# Patient Record
Sex: MALE | Race: WHITE | NOT HISPANIC OR LATINO | Employment: STUDENT | URBAN - METROPOLITAN AREA
[De-identification: names, ages, dates, MRNs, and addresses within clinical notes are randomized per-mention and may not be internally consistent; named-entity substitution may affect disease eponyms.]

---

## 2024-06-09 ENCOUNTER — HOSPITAL ENCOUNTER (EMERGENCY)
Facility: CLINIC | Age: 24
Discharge: HOME | End: 2024-06-09
Attending: EMERGENCY MEDICINE | Admitting: EMERGENCY MEDICINE
Payer: COMMERCIAL

## 2024-06-09 VITALS
BODY MASS INDEX: 24.38 KG/M2 | RESPIRATION RATE: 16 BRPM | OXYGEN SATURATION: 99 % | HEART RATE: 51 BPM | DIASTOLIC BLOOD PRESSURE: 61 MMHG | SYSTOLIC BLOOD PRESSURE: 118 MMHG | TEMPERATURE: 97.7 F | HEIGHT: 74 IN | WEIGHT: 190 LBS

## 2024-06-09 DIAGNOSIS — S06.0X0A CONCUSSION WITHOUT LOSS OF CONSCIOUSNESS, INITIAL ENCOUNTER: ICD-10-CM

## 2024-06-09 PROCEDURE — 99283 EMERGENCY DEPT VISIT LOW MDM: CPT

## 2024-06-09 PROCEDURE — 250N000013 HC RX MED GY IP 250 OP 250 PS 637: Performed by: EMERGENCY MEDICINE

## 2024-06-09 RX ORDER — ACETAMINOPHEN 500 MG
1000 TABLET ORAL ONCE
Status: COMPLETED | OUTPATIENT
Start: 2024-06-09 | End: 2024-06-09

## 2024-06-09 RX ADMIN — ACETAMINOPHEN 1000 MG: 500 TABLET, FILM COATED ORAL at 03:44

## 2024-06-09 ASSESSMENT — COLUMBIA-SUICIDE SEVERITY RATING SCALE - C-SSRS
6. HAVE YOU EVER DONE ANYTHING, STARTED TO DO ANYTHING, OR PREPARED TO DO ANYTHING TO END YOUR LIFE?: NO
2. HAVE YOU ACTUALLY HAD ANY THOUGHTS OF KILLING YOURSELF IN THE PAST MONTH?: NO
1. IN THE PAST MONTH, HAVE YOU WISHED YOU WERE DEAD OR WISHED YOU COULD GO TO SLEEP AND NOT WAKE UP?: NO

## 2024-06-09 ASSESSMENT — ACTIVITIES OF DAILY LIVING (ADL)
ADLS_ACUITY_SCORE: 33
ADLS_ACUITY_SCORE: 35

## 2024-06-09 NOTE — ED PROVIDER NOTES
"  Emergency Department Note      History of Present Illness     Chief Complaint  Head Injury    HPI  Yenny Cazares is a 23 year old male presents after a head injury.  He reports that at approximately 2000 he was playing in a soccer game and collided with another player and was accidentally kneed in the head.  He states he did not lose consciousness, but felt dazed for a minute.  He had some pain initially, but the pain has improved.  He has not had any vomiting or other symptoms.  He has not taken anything for the pain.  He does report prior concussions in the past.      Review of External Notes   note 4/29/19 for concussion  Past Medical History   Medical History and Problem List  No past medical history on file.    Medications  No current outpatient medications on file.      Surgical History   No past surgical history on file.  Physical Exam   Patient Vitals for the past 24 hrs:   BP Temp Temp src Pulse Resp SpO2 Height Weight   06/09/24 0257 118/61 97.7  F (36.5  C) Temporal 51 16 99 % 1.88 m (6' 2\") 86.2 kg (190 lb)     Physical Exam  General: Appears well-developed and well-nourished.   Head: No signs of trauma.   Mouth/Throat: Oropharynx is clear and moist.   Eyes: Conjunctivae are normal. Pupils are equal, round, and reactive to light.   Neck: Normal range of motion. No nuchal rigidity. No cervical adenopathy  CV: Normal rate and regular rhythm.    Resp: Effort normal and breath sounds normal. No respiratory distress.   MSK: Normal range of motion. no edema. No Calf tenderness.  Neuro: The patient is alert and oriented to person, place, and time.  PERRLA, EOMI, strength in upper/lower extremities normal and symmetrical. Sensation normal. Speech normal.  Skin: Skin is warm and dry. No rash noted.   Psych: normal mood and affect. behavior is normal.     Diagnostics     ED Course    Medications Administered  Medications   acetaminophen (TYLENOL) tablet 1,000 mg (1,000 mg Oral $Given 6/9/24 5504) "       Procedures  Procedures       Social Determinants of Health adding to complexity of care  None    ED Course       [place] Head CT Rule  (calculator)  Background  Assesses need for head imaging in acute trauma  Only validated in adults with GCS 13-15 with witnessed LOC, amnesia to head injury or confusion  Data  23 year old  High Risk Criteria (major criteria)   Of 5 possible items  NEGATIVE  [place] Coma Scale <15 at 2 hours after injury  Open or depressed skull Fracture  Vomiting (Two or more episodes)  Age 65 years or over (other studies suggest age 60)  Basal skull Fracture signs (Hemotympanum, Periorbital Bruising -Raccoon's Eyes, Mastoid process Ecchymosis -Alvarez's Sign, Cerebrospinal fluid leakage from ear or nose)    Moderate Risk Criteria (minor criteria)   Of 3 possible items  NEGATIVE  Pre-trauma amnesia lasting longer than 30 minutes  High risk mechanism of injury (Pedestrian in motor vehicle accident, Passenger ejected from vehicle, Fall from height over 3 feet or 5 stairs)    Interpretation  No indications for head imaging    Medical Decision Making / Diagnosis     JEFF Cazares is a 23 year old male presents after a head injury.  Reports earlier in the evening he was playing soccer and accidentally collided it with another player got hit in the head.  He did not lose consciousness but felt somewhat dazed initially.  Since that time he states that the pain has improved.  He has not had any vomiting or other symptoms.  On my evaluation he is fully neurologically intact.  There is no clear external signs of trauma.  Based on the [place] head CT rule he is low risk for significant intracranial pathology.  I did discuss this with the patient and his friend who had accompanied him.  I discussed the pros and cons of a CT scan, and the patient was comfortable with not doing a CT scan at this time given the radiation exposure.  I did discuss that he likely has a concussion and discussed concussion  recommendations with him.  He was discharged with the patient for supportive care and given referral to the concussion clinic.  Return precautions were discussed with the patient and his friend.    Disposition  The patient was discharged.     ICD-10 Codes:    ICD-10-CM    1. Concussion without loss of consciousness, initial encounter  S06.0X0A Concussion  Referral           Discharge Medications  There are no discharge medications for this patient.        MD Roman Campoverde John A, MD  06/09/24 0518

## 2024-06-09 NOTE — ED TRIAGE NOTES
Pt was playing soccer at 2000 last night and another player kneed or kicked him in the head. Pt reports he does not remember the impact but came to 30 seconds after impact. Pt reports on and off HA since.      Triage Assessment (Adult)       Row Name 06/09/24 0259          Triage Assessment    Airway WDL WDL        Respiratory WDL    Respiratory WDL WDL        Cardiac WDL    Pulse Rate & Regularity bradycardic        Cognitive/Neuro/Behavioral WDL    Cognitive/Neuro/Behavioral WDL WDL

## 2024-06-11 ENCOUNTER — OFFICE VISIT (OUTPATIENT)
Dept: ORTHOPEDICS | Facility: CLINIC | Age: 24
End: 2024-06-11
Attending: EMERGENCY MEDICINE
Payer: COMMERCIAL

## 2024-06-11 VITALS — HEIGHT: 74 IN | BODY MASS INDEX: 24.38 KG/M2 | WEIGHT: 190 LBS

## 2024-06-11 DIAGNOSIS — S06.0X0A CONCUSSION WITHOUT LOSS OF CONSCIOUSNESS, INITIAL ENCOUNTER: Primary | ICD-10-CM

## 2024-06-11 PROCEDURE — 99204 OFFICE O/P NEW MOD 45 MIN: CPT | Performed by: STUDENT IN AN ORGANIZED HEALTH CARE EDUCATION/TRAINING PROGRAM

## 2024-06-11 RX ORDER — ESCITALOPRAM OXALATE 5 MG/1
1 TABLET ORAL
COMMUNITY
Start: 2024-05-20

## 2024-06-11 NOTE — Clinical Note
6/11/2024      Yenny Cazares  [address] [facility] [address]      Dear Colleague,    Thank you for referring your patient, Yenny Cazares, to the [facility] [place]. Please see a copy of my visit note below.    New Concussion Visit     Assessment  [unfilled]   Yenny Cazares presents with the following concussion trajectories.  {Trajectories:053057}    PLAN  Activity Recommendations  Yenny Cazares was provided with the following activity recommendations: {Concussionplan:740871}    Specialty Referrals:  Yenny Cazares is being referred for additional follow up with the following specialty/specialties:     Academic Recommendations:  Yenny Cazares was provided with the following return to learn recommendations. {RW PT CONCUSSION SPECIFIC ACCOMODATIONS:765735}    Sleep Hygiene:  We discussed the importance of sleep hygiene and provided you with a handout discussing some tips to help you achieve adequate rest and recovery.     Nutrition/Hydration:  -We discussed the importance of proper diet and hydration during concussion recovery and provided a handout outlining helpful nutritional tips. Be sure to drink enough water to satisfy your thirst. Have a drink readily available throughout the day. Do not force yourself to over-drink as this can be dangerous.     Diagnosis and management of concussion discussed with patient and/or caregiver(s) who verbalized understanding and agreement with the above treatment plan. Discussion included risk of repeat concussion, red flags for return to clinic/ER.    Time spent in one-on-one evaluation and discussion with patient regarding nature of problem, course, prior treatments, and therapeutic options, at least 50% of which was spent in counseling and coordination of care:  *** minutes including time spent in administration, interpretation, analysis and discussion of the role of SCOAT6 testing.    ALIZA Calvo DO CAQSM       Subjective    Yenny Cazares is a 23 year old  male coming to sports medicine clinic today for evaluation of a head injury sustained on 6/8/24 . Here today by himself. Referred to us by ***. 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 Yenny Cazares goes to school at [employer] in [place], [place].  Mechanism of injury - he was playing in a soccer game when he collided with another player and believes he got kneed in the head. The next thing he remembers is the AT standing over him. He did return to play for ~ 30 minutes and was subbed out around the 85 minute trista of the game. Treatment to date includes Tylenol. Imaging to date: none  Academic Schedule includes - currently on summer break  Employment includes coaching part time     A) Symptoms at time of injury? memory problems and tremor  B) Mechanism of injury: Direct blow to head   C) Location of impact: Other top of head   D) Did patient see hit coming? no   E) Did patient continue playing after injury? Yes for ~ 30 minutes    Amnesia   Are there any events around the time of injury that you/person has no memory of even briefly? Yes ~ 30 minutes after injury    Loss of Consciousness   Did you/person lose consciousness? He is unsure but does not think so    Early Signs {Early Signs:35331}    Seizures  Were seizures/postures observed?   no    Sleep Changes No Issues but feeling more tired    Emotional/Personality  Is Yenny Cazares acting different compared to his/her usual self? yes      Risk Factors for Protracted Recovery:    History of Previous Concussions? Yes - 5 previous    If Yes, When? Dates: 2018, 2019, spring 2023, fall 2023, spring 2024 - Duration of symptoms: 1 week was the longest     If multiple concussions, did less force cause reinjury? no    Intrinsic Risk Factors  Younger Age (<13)  no   Female Gender no   Diagnosed Learning Disability, ADD, ADHD no   Migraine History & Symptoms; if yes include treatment no   Motion Sickness no   Ocular Dysfunction  "no   Sleep Problems before the injury no     Other Relevant Medical History   Anxiety Yes - lexapro   Depression Yes - not currently treating   Seizures no   Syncope no   Cardiovascular/POTS no     Relevant Family History   Migraine History & Symptoms no   Diagnosed Learning Disability no   Anxiety no   Depression no   Ocular Dysfunction no   Seizures no     Symptom Score:    Symptom 6/11/24    Headache 1    \"Pressure in head\" 2    Neck pain 2    Nausea or vomiting 0    Dizziness 1    Blurred vision 0    Balance problems 0    Sensitivity to light 0    Sensitivity to noise 0    Feeling slowed down 2    Feeling \"like in a fog\" 2    Difficulty concentrating 1    Difficulty remembering 1    Fatigue or low energy 2    Confusion 1    Drowsiness 2    More emotional 0    Irritability 0    Sadness 0    Nervous or anxious 0    Sleep disturbance 0    Abnormal heart rate 0    Excessive sweating 0    Number of symptoms 11/23    Total symptom score 17/138        Past Medical/Surgical/Social History   I have reviewed past medical, surgical, social and family history, medications and allergies as documented in the EMR.  Current Problem List:does not have a problem list on file.  Past Medical History:  has no past medical history on file.   Past Surgical History:  has no past surgical history on file.  Current Medications: currently has no medications in their medication list.  Allergies: has No Known Allergies.  Social History: [unfilled]  Family History: family history is not on file.    Review of Systems   A comprehensive 14 point ROS was performed, reviewed, and the pertinent orthopaedic findings are documented in the HPI.    Physical Examination  There were no vitals taken for this visit.  HEENT:   NC/AT, auditory canals patent, Oropharynx normal, PERRLA, EOMI, no fundic pathology  NECK:    normal ROM ***       No spinal process tenderness, Spurling's negative  NEURO: Finger-to-nose testing, Romberg negative, no pronator drift, " DTR/MS/Sensation normal in both upper extremities      Cervical Screening:  Neck Pain: ***/10       Palpation: {FSOC CSPINE PALPATION REV:273405}   Range of Motion   Flexion  {Normal/abnormal.:968016}  Symptoms: {YES/NO:60}   Extension  {Normal/abnormal.:798808}  Symptoms: {YES/NO:60}   Left Rotation  {Normal/abnormal.:364984}  Symptoms:{YES/NO:60}   Right Rotation  {Normal/abnormal.:084273}  Symptoms: {YES/NO:60}   Left Sidebend  {Normal/abnormal.:710421}  Symptoms: {YES/NO:60}   Right Sidebend  {Normal/abnormal.:296981}  Symptoms: {YES/NO:60}       Cognitive Assessment:    [facility]   24   Orientation: ***/5       Immediate Recall: Word List: B   Trial 1: 8/10   Trial 2: 8/10   Trial 3:  10/10   Total:        Concentration    Digits Backward: 34   Months Backward:   (20 sec) (0 errors)   Total:        DLROW able   Serial 7s able       Delayed Recall: 7/10       Total Score: ***/50     Balance Testing (mBess)  Double le errors  Single le errors - left foot  Tandem: 1 errors    Timed Tandem Gait  {Performed/Not:84522}  Trial 1 *** sec   Trial 2 *** sec   Trial 3 *** sec   Average *** sec   Errors {TTGERRORS:69054}   Normal <11.7sec    Complex Tandem Gait  {Performed/Not:24850}  Forward *** errors   Backward *** errors   Total *** errors   Abnormal: 5+ errrors       Vestibular Testin24   Smooth Pursuits  abnormal, nystagmus   Symptoms: Yes but no change from baseline   Saccades - Horizontal  normal  Symptoms:Yes but no change   Saccades - Vertical  normal  Symptoms: Yes: dizziness    Near Point of Convergence (Normal 6-8 cm)  7 cm to blurry vision  6 cm to double vision  Symptoms: Yes: head pressure   VOR - Horizontal  normal  Symptoms: Yes: dizziness   VOR - Vertical  normal  Symptoms: Yes: dizziness   VMS  normal  Symptoms: Yes: dizziness           Again, thank you for allowing me to participate in the care of your patient.        Sincerely,        Grady Calvo,

## 2024-06-11 NOTE — PROGRESS NOTES
New Concussion Visit     Assessment  Concussion without loss of consciousness, initial encounter  (primary encounter diagnosis)  Yenny Cazares presents with the following concussion trajectories.  Vestibular, Cognitive/fatigue, Post-traumatic migraine/headache     PLAN  Activity Recommendations  Yenny Cazares was provided with the following activity recommendations: Restricted from activity at this time     Specialty Referrals:  Yenny Cazares is being referred for additional follow up with the following specialty/specialties: Physical therapy     Academic Recommendations:  Yenny Cazares was provided with the following return to learn recommendations:  N/a    Sleep Hygiene:  We discussed the importance of sleep hygiene and provided you with a handout discussing some tips to help you achieve adequate rest and recovery.     Nutrition/Hydration:  -We discussed the importance of proper diet and hydration during concussion recovery and provided a handout outlining helpful nutritional tips. Be sure to drink enough water to satisfy your thirst. Have a drink readily available throughout the day. Do not force yourself to over-drink as this can be dangerous.     Diagnosis and management of concussion discussed with patient and/or caregiver(s) who verbalized understanding and agreement with the above treatment plan. Discussion included risk of repeat concussion, red flags for return to clinic/ER.    ALIZA Calvo DO CAQSM       Subjective    Yenny Cazares is a 23 year old male coming to sports medicine clinic today for evaluation of a head injury sustained on 6/8/24 . Here today by himself. Referred to us by Dr. Owens. 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 Yenny Cazares goes to school at [employer] in [place], [place]. Patient is from [place] but is in [place] this summer to play soccer. Mechanism of injury - he was playing in a soccer game when he collided with another player and  believes he got kneed in the head. The next thing he remembers is the AT standing over him. He did return to play for ~ 30 minutes and was subbed out around the 85 minute trista of the game. Patient was evaluated in the ED on 6/9/24. Treatment to date includes Tylenol, rest. Imaging to date: none  Academic Schedule - currently on summer break  Employment includes coaching part time     A) Symptoms at time of injury? memory problems and tremor  B) Mechanism of injury: Direct blow to head   C) Location of impact: Other top of head   D) Did patient see hit coming? no   E) Did patient continue playing after injury? Yes for ~ 30 minutes    Amnesia   Are there any events around the time of injury that you/person has no memory of even briefly? Yes ~ 30 minutes after injury    Loss of Consciousness   Did you/person lose consciousness? He is unsure but does not think so    Early Signs     Seizures  Were seizures/postures observed?   no    Sleep Changes No Issues but feeling more tired    Emotional/Personality  Is Yenny Cazares acting different compared to his/her usual self? yes      Risk Factors for Protracted Recovery:    History of Previous Concussions? Yes - 5 previous    If Yes, When? Dates: 2018, 2019, spring 2023, fall 2023, spring 2024 - Duration of symptoms: 1 week was the longest     If multiple concussions, did less force cause reinjury? no    Intrinsic Risk Factors  Younger Age (<13)  no   Female Gender no   Diagnosed Learning Disability, ADD, ADHD no   Migraine History & Symptoms; if yes include treatment no   Motion Sickness no   Ocular Dysfunction no   Sleep Problems before the injury no     Other Relevant Medical History   Anxiety Yes - lexapro   Depression Yes - not currently treating   Seizures no   Syncope no   Cardiovascular/POTS no     Relevant Family History   Migraine History & Symptoms no   Diagnosed Learning Disability no   Anxiety no   Depression no   Ocular Dysfunction no   Seizures no     Symptom  "Score:    Symptom 6/11/24    Headache 1    \"Pressure in head\" 2    Neck pain 2    Nausea or vomiting 0    Dizziness 1    Blurred vision 0    Balance problems 0    Sensitivity to light 0    Sensitivity to noise 0    Feeling slowed down 2    Feeling \"like in a fog\" 2    Difficulty concentrating 1    Difficulty remembering 1    Fatigue or low energy 2    Confusion 1    Drowsiness 2    More emotional 0    Irritability 0    Sadness 0    Nervous or anxious 0    Sleep disturbance 0    Abnormal heart rate 0    Excessive sweating 0    Number of symptoms 11/23    Total symptom score 17/138        Past Medical/Surgical/Social History   I have reviewed past medical, surgical, social and family history, medications and allergies as documented in the EMR.  Current Problem List:does not have a problem list on file.  Past Medical History:  has no past medical history on file.   Past Surgical History:  has no past surgical history on file.  Current Medications: currently has no medications in their medication list.  Allergies: has No Known Allergies.  Social History: [unfilled]  Family History: family history is not on file.    Review of Systems   A comprehensive 14 point ROS was performed, reviewed, and the pertinent orthopaedic findings are documented in the HPI.    Physical Examination  There were no vitals taken for this visit.  HEENT:   NC/AT, auditory canals patent, Oropharynx normal, PERRLA, EOMI, no fundic pathology  NECK:    normal ROM        No spinal process tenderness, Spurling's negative  NEURO: Finger-to-nose testing, Romberg negative, no pronator drift, DTR/MS/Sensation normal in both upper extremities      Cervical Screening:  Neck Pain: 1/10       Palpation: none   Range of Motion   Flexion  normal  Symptoms: No   Extension  normal  Symptoms: No   Left Rotation  normal  Symptoms:No   Right Rotation  normal  Symptoms: No   Left Sidebend  normal  Symptoms: No   Right Sidebend  normal  Symptoms: No       Cognitive " Assessment:    [facility]   24   Orientation:        Immediate Recall: Word List: B   Trial 1: 8/10   Trial 2: 8/10   Trial 3:  10/10   Total:        Concentration    Digits Backward: 3/4   Months Backward:   (20 sec) (0 errors)   Total:        DLROW able   Serial 7s able       Delayed Recall: 7/10       Total Score: 23/50     Balance Testing (mBess)  Double le errors  Single le errors - left foot  Tandem: 1 errors    Vestibular Testin24   Smooth Pursuits  abnormal, nystagmus   Symptoms: Yes but no change from baseline   Saccades - Horizontal  normal  Symptoms:Yes but no change   Saccades - Vertical  normal  Symptoms: Yes: dizziness    Near Point of Convergence (Normal 6-8 cm)  7 cm to blurry vision  6 cm to double vision  Symptoms: Yes: head pressure   VOR - Horizontal  normal  Symptoms: Yes: dizziness   VOR - Vertical  normal  Symptoms: Yes: dizziness   VMS  normal  Symptoms: Yes: dizziness

## 2024-06-25 ENCOUNTER — OFFICE VISIT (OUTPATIENT)
Dept: ORTHOPEDICS | Facility: CLINIC | Age: 24
End: 2024-06-25
Payer: COMMERCIAL

## 2024-06-25 VITALS — WEIGHT: 190 LBS | HEIGHT: 74 IN | BODY MASS INDEX: 24.38 KG/M2

## 2024-06-25 DIAGNOSIS — S06.0X0D CONCUSSION WITHOUT LOSS OF CONSCIOUSNESS, SUBSEQUENT ENCOUNTER: Primary | ICD-10-CM

## 2024-06-25 PROCEDURE — 99214 OFFICE O/P EST MOD 30 MIN: CPT | Performed by: STUDENT IN AN ORGANIZED HEALTH CARE EDUCATION/TRAINING PROGRAM

## 2024-06-25 NOTE — PROGRESS NOTES
Concussion Follow-Up Visit    Assessment  (S06.0X0D) Concussion without loss of consciousness, subsequent encounter  (primary encounter diagnosis)  Yenny presents to clinic to follow-up on his concussion.  Since our last visit he has marked improvement in symptoms and feels like he has returned to his baseline.  He has performed some softer specific activities without return or exacerbation of his symptoms.  At this point, he can begin the return to play protocol formally under the supervision of his team's , and as long as he progresses through the protocol without symptoms he can return to play and will follow-up as needed  PLAN  Activity Recommendations  Yenny Cazares was provided with the following activity recommendations: Able to begin Return to Play protocol under the supervision of an appropriate health care provider (e.g.,  ).    Specialty Referrals:  Yenny Cazares is being referred for additional follow up with the following specialty/specialties: None    Academic Recommendations:  Yenny Cazares was provided with the following return to learn recommendations. None    Sleep Hygiene:  We discussed the importance of sleep hygiene and provided you with a handout discussing some tips to help you achieve adequate rest and recovery.     Nutrition/Hydration:  -We discussed the importance of proper diet and hydration during concussion recovery and provided a handout outlining helpful nutritional tips. Be sure to drink enough water to satisfy your thirst. Have a drink readily available throughout the day. Do not force yourself to over-drink as this can be dangerous.     Diagnosis and management of concussion discussed with patient and/or caregiver(s) who verbalized understanding and agreement with the above treatment plan. Discussion included risk of repeat concussion, red flags for return to clinic/ER.    ALIZA Calvo DO CAABHIJITM     ---------------    Chief Complaint  chief  "complaint    History of Present Illness  Yenny Cazares is a 23 year old male returning to sports medicine clinic today for follow-up of a head injury sustained on 6/8/24.  Treatment to date includes Tylenol for neck pain - has not taken in 3-4 days.   Since their last visit, patient reports he is feeling much better. He still reports some neck pain / soreness, but otherwise all other symptoms have improved \"100%\". He states he feels like he has returned to \"normal\" / baseline.      Symptom Score:    Symptom 6/25/24 6/11/24    Headache 0 1   \"Pressure in head\" 0 2   Neck pain 1 2   Nausea or vomiting 0 0   Dizziness 0 1   Blurred vision 0 0   Balance problems 0 0   Sensitivity to light 0 0   Sensitivity to noise 0 0   Feeling slowed down 0 2   Feeling \"like in a fog\" 0 2   Difficulty concentrating 0 1   Difficulty remembering 0 1   Fatigue or low energy 0 2   Confusion 0 1   Drowsiness 0 2   More emotional 0 0   Irritability 0 0   Sadness 0 0   Nervous or anxious 0 0   Sleep disturbance 0 0   Abnormal heart rate 0 0   Excessive sweating 0 0   Number of symptoms 1/23 11/23   Total symptom score 1/138 17/138       Past Medical/Surgical/Social History   I have reviewed past medical, surgical, social and family history, medications and allergies as documented in the EMR.  Current Problem List:does not have a problem list on file.  Past Medical History:  has a past medical history of Depression and Panic attack.   Past Surgical History:  has no past surgical history on file.  Current Medications: has a current medication list which includes the following prescription(s): escitalopram.  Allergies: has No Known Allergies.  Social History: [unfilled]  Family History: family history is not on file.    Review of Systems   A comprehensive 14 point ROS was performed, reviewed, and the pertinent orthopaedic findings are documented in the HPI.    Physical Examination  There were no vitals taken for this visit.  HEENT:   NC/AT, " auditory canals patent, Oropharynx normal, PERRLA, EOMI, no fundic pathology  NECK:    normal ROM        No spinal process tenderness, Spurling's negative  NEURO: Finger-to-nose testing, Romberg negative, no pronator drift, DTR/MS/Sensation normal in both upper extremities      Cervical Screening:  Neck Pain: 0/10       Palpation: none   Range of Motion   Flexion  normal  Symptoms: No   Extension  normal  Symptoms: No   Left Rotation  normal  Symptoms:No   Right Rotation  normal  Symptoms: No   Left Sidebend  normal  Symptoms: No   Right Sidebend  normal  Symptoms: No       Cognitive Assessment:    [facility]   24   Orientation:        Immediate Recall: Word List: A   Trial 1: 7/10   Trial 2: 8/10   Trial 3:  8/10   Total: 30       Concentration    Digits Backward:    Months Backward:   (20 sec) (0 errors)   Total:        DLROW able   Serial 7s able       Delayed Recall: 6/10       Total Score: 39/50     Balance Testing (mBess)  Double le errors  Single le errors  Tandem: 0 errors      Vestibular Testin24   Smooth Pursuits  normal  Symptoms: No   Saccades - Horizontal  normal  20 eye movements in 10 sec  Symptoms:No   Saccades - Vertical  normal  22 eye movements in 10 sec  Symptoms: No    Near Point of Convergence (Normal 6-8 cm)  8 cm to blurry vision  4 cm to double vision  Symptoms: No   VOR - Horizontal  normal  30 head turns in 10 sec  Symptoms: No   VOR - Vertical  normal  30 head nods in 10 sec  Symptoms: No   VMS  normal  Symptoms: No

## 2024-06-25 NOTE — LETTER
6/25/2024      Yenny Cazares  [address] [facility] [address]      Dear Colleague,    Thank you for referring your patient, Yenny Cazares, to the [facility] [place]. Please see a copy of my visit note below.      Concussion Follow-Up Visit    Assessment  (S06.0X0D) Concussion without loss of consciousness, subsequent encounter  (primary encounter diagnosis)  Yenny presents to clinic to follow-up on his concussion.  Since our last visit he has marked improvement in symptoms and feels like he has returned to his baseline.  He has performed some softer specific activities without return or exacerbation of his symptoms.  At this point, he can begin the return to play protocol formally under the supervision of his team's , and as long as he progresses through the protocol without symptoms he can return to play and will follow-up as needed  PLAN  Activity Recommendations  Yenny Cazares was provided with the following activity recommendations: Able to begin Return to Play protocol under the supervision of an appropriate health care provider (e.g.,  ).    Specialty Referrals:  Yenny Cazares is being referred for additional follow up with the following specialty/specialties: None    Academic Recommendations:  Yenny Cazares was provided with the following return to learn recommendations. None    Sleep Hygiene:  We discussed the importance of sleep hygiene and provided you with a handout discussing some tips to help you achieve adequate rest and recovery.     Nutrition/Hydration:  -We discussed the importance of proper diet and hydration during concussion recovery and provided a handout outlining helpful nutritional tips. Be sure to drink enough water to satisfy your thirst. Have a drink readily available throughout the day. Do not force yourself to over-drink as this can be dangerous.     Diagnosis and management of concussion discussed with patient and/or  "caregiver(s) who verbalized understanding and agreement with the above treatment plan. Discussion included risk of repeat concussion, red flags for return to clinic/ER.    ALIZA Calvo DO CAQSM     ---------------    Chief Complaint  chief complaint    History of Present Illness  Yenny Cazares is a 23 year old male returning to sports medicine clinic today for follow-up of a head injury sustained on 6/8/24.  Treatment to date includes Tylenol for neck pain - has not taken in 3-4 days.   Since their last visit, patient reports he is feeling much better. He still reports some neck pain / soreness, but otherwise all other symptoms have improved \"100%\". He states he feels like he has returned to \"normal\" / baseline.      Symptom Score:    Symptom 6/25/24 6/11/24    Headache 0 1   \"Pressure in head\" 0 2   Neck pain 1 2   Nausea or vomiting 0 0   Dizziness 0 1   Blurred vision 0 0   Balance problems 0 0   Sensitivity to light 0 0   Sensitivity to noise 0 0   Feeling slowed down 0 2   Feeling \"like in a fog\" 0 2   Difficulty concentrating 0 1   Difficulty remembering 0 1   Fatigue or low energy 0 2   Confusion 0 1   Drowsiness 0 2   More emotional 0 0   Irritability 0 0   Sadness 0 0   Nervous or anxious 0 0   Sleep disturbance 0 0   Abnormal heart rate 0 0   Excessive sweating 0 0   Number of symptoms 1/23 11/23   Total symptom score 1/138 17/138       Past Medical/Surgical/Social History   I have reviewed past medical, surgical, social and family history, medications and allergies as documented in the EMR.  Current Problem List:does not have a problem list on file.  Past Medical History:  has a past medical history of Depression and Panic attack.   Past Surgical History:  has no past surgical history on file.  Current Medications: has a current medication list which includes the following prescription(s): escitalopram.  Allergies: has No Known Allergies.  Social History: [unfilled]  Family History: family history is not on " file.    Review of Systems   A comprehensive 14 point ROS was performed, reviewed, and the pertinent orthopaedic findings are documented in the HPI.    Physical Examination  There were no vitals taken for this visit.  HEENT:   NC/AT, auditory canals patent, Oropharynx normal, PERRLA, EOMI, no fundic pathology  NECK:    normal ROM        No spinal process tenderness, Spurling's negative  NEURO: Finger-to-nose testing, Romberg negative, no pronator drift, DTR/MS/Sensation normal in both upper extremities      Cervical Screening:  Neck Pain: 0/10       Palpation: none   Range of Motion   Flexion  normal  Symptoms: No   Extension  normal  Symptoms: No   Left Rotation  normal  Symptoms:No   Right Rotation  normal  Symptoms: No   Left Sidebend  normal  Symptoms: No   Right Sidebend  normal  Symptoms: No       Cognitive Assessment:    [facility]   24   Orientation:        Immediate Recall: Word List: A   Trial 1: 7/10   Trial 2: 8/10   Trial 3:  8/10   Total: 23/30       Concentration    Digits Backward: 4/4   Months Backward:   (20 sec) (0 errors)   Total: /       DLROW able   Serial 7s able       Delayed Recall: 6/10       Total Score: 39/50     Balance Testing (mBess)  Double le errors  Single le errors  Tandem: 0 errors      Vestibular Testin24   Smooth Pursuits  normal  Symptoms: No   Saccades - Horizontal  normal  20 eye movements in 10 sec  Symptoms:No   Saccades - Vertical  normal  22 eye movements in 10 sec  Symptoms: No    Near Point of Convergence (Normal 6-8 cm)  8 cm to blurry vision  4 cm to double vision  Symptoms: No   VOR - Horizontal  normal  30 head turns in 10 sec  Symptoms: No   VOR - Vertical  normal  30 head nods in 10 sec  Symptoms: No   VMS  normal  Symptoms: No       Again, thank you for allowing me to participate in the care of your patient.        Sincerely,        Grady Calvo, DO

## 2024-06-25 NOTE — LETTER
Returning to Play After a Sports Concussion     Page 1 of 3    Athlete s name: __________________________________ Date of birth: ________     [] You are cleared to begin a trial of gradual return to play. Be sure to use the stages and instructions given here. If symptoms return, you must go back to the previous stage until you have no symptoms for 24 hours. When you have finished all six stages, you may return to full competition.   [] Other:  _________________________________________________________    _______________________________________________________________________  Signature of doctor or health care provider         Date    _______________________________________________________________________   Print name           Phone          Stages of Activity  There are 6 stages to finish before you return to full competition (see page 2). Do not complete more than one stage in a day. You may move to the next stage only after you are free of symptoms for 24 hours.      To date, the athlete has finished (check one)  [] No activity     [] Stage 1    [] Stage 2    [] Stage 3    [] Stage 4    [] Stage 5    [] Stage 6    As long as you have no symptoms, you can work in stages _______________________  ______________________________________________________________________                                                            Page 2 of 3   Aerobic THR  (target heart rate) Strength Contact  Balance  Other   Stage 1    ________  (Date) Very light:  (stationary bike, walking, or treadmill walking) for 10 to 15 min. 30-40% of maximum effort; (0-1 on Effort scale)  Light strength exercises: light hand weights or no weight   None  Exercises: walking heel to toe, single leg balance (eyes open and eyes closed) Stretching   Stage 2  ________  (Date) Light to moderate: (stationary bike, treadmill) for 20 to 25 minutes   40-60% of maximum effort; (2-3 on Effort scale)  Light weight lifting: lunges, wall squats, step ups/ downs,  light weight on equipment None Exercises: walking with head turns, Swiss ball exercises    Stage 3  ________  (Date) Moderate: (may start jogging) for 25 to 30 minutes 60-80% of maximum effort; (4-5 on the Effort scale)   Free weights, dynamic strength activities (no more than 80% max) Limited practice without contact  Challenging balance drills: BOSU ball, Swiss ball, trampoline, balance discs (eyes open and eyes closed) Impact activities: plyometrics, agility drills, jumping;  sports-specific drills   Stage 4  ________  (Date) Interval training; graded treadmill or hill running   80% of maximum effort; (6 on the Effort scale) Full strength training  Full practice without contact Challenging balance drills      Stage 5  ________  (Date) Interval training;  graded treadmill or hill running   80% of maximum effort (6 on the Effort scale) Full strength training  Full practice with contact Challenging balance drills    Stage 6  ________  (Date) Return to competition and collision activities                           Page 3 of 3          Target Heart Rate    To track your exercise levels, use Target heart rate (THR) and the Effort scale.    Target heart rate is (maximum heart rate minus resting heart rate)   times ___% maximum exertion plus resting HR.    Maximum HR is 220 minus your age.    Resting HR is the number of beats in one minute (beats per minute or bpm)         Example: A 17-year-old working in Stage 1 may do 30% of maximum exertion.           Max HR is 220 ? 16 = 204    Resting HR measured at 65 bpm:  204 ? 65  x .30 + 65 = about 107 bpm

## 2024-06-26 ENCOUNTER — HOSPITAL ENCOUNTER (EMERGENCY)
Facility: CLINIC | Age: 24
Discharge: HOME | End: 2024-06-26
Attending: EMERGENCY MEDICINE | Admitting: EMERGENCY MEDICINE
Payer: COMMERCIAL

## 2024-06-26 ENCOUNTER — APPOINTMENT (OUTPATIENT)
Dept: CT IMAGING | Facility: CLINIC | Age: 24
End: 2024-06-26
Attending: EMERGENCY MEDICINE
Payer: COMMERCIAL

## 2024-06-26 VITALS
RESPIRATION RATE: 16 BRPM | TEMPERATURE: 98.4 F | OXYGEN SATURATION: 98 % | HEIGHT: 74 IN | HEART RATE: 67 BPM | SYSTOLIC BLOOD PRESSURE: 133 MMHG | BODY MASS INDEX: 24.38 KG/M2 | WEIGHT: 190 LBS | DIASTOLIC BLOOD PRESSURE: 84 MMHG

## 2024-06-26 DIAGNOSIS — R51.9 ACUTE NONINTRACTABLE HEADACHE, UNSPECIFIED HEADACHE TYPE: ICD-10-CM

## 2024-06-26 PROCEDURE — 70450 CT HEAD/BRAIN W/O DYE: CPT

## 2024-06-26 PROCEDURE — 99284 EMERGENCY DEPT VISIT MOD MDM: CPT | Mod: 25

## 2024-06-26 ASSESSMENT — ACTIVITIES OF DAILY LIVING (ADL)
ADLS_ACUITY_SCORE: 35
ADLS_ACUITY_SCORE: 35

## 2024-06-26 ASSESSMENT — COLUMBIA-SUICIDE SEVERITY RATING SCALE - C-SSRS
1. IN THE PAST MONTH, HAVE YOU WISHED YOU WERE DEAD OR WISHED YOU COULD GO TO SLEEP AND NOT WAKE UP?: NO
6. HAVE YOU EVER DONE ANYTHING, STARTED TO DO ANYTHING, OR PREPARED TO DO ANYTHING TO END YOUR LIFE?: NO
2. HAVE YOU ACTUALLY HAD ANY THOUGHTS OF KILLING YOURSELF IN THE PAST MONTH?: NO

## 2024-06-26 NOTE — ED PROVIDER NOTES
"  Emergency Department Note      History of Present Illness     Chief Complaint   Headache    HPI   Yenny Cazares is a 23 year old male without significant past medical history other than concussion associated with soccer 2 weeks ago presenting for sudden onset severe headache defecating.  He denies any weakness or numbness, denies any neck pain.  No fevers or chills.  He reports the headache lasted about 5 minutes and resolved.  Suspect his concussion, he was cleared to return to play did have a brief LOC with it. Was seen, no imaging of head performed.    Independent Historian   None    Review of External Notes   None  Past Medical History   Medical History and Problem List   Past Medical History:   Diagnosis Date    Depression     Panic attack        Medications   escitalopram (LEXAPRO) 5 MG tablet        Surgical History   History reviewed. No pertinent surgical history.  Physical Exam   Patient Vitals for the past 24 hrs:   BP Temp Temp src Pulse Resp SpO2 Height Weight   06/26/24 0158 133/84 -- -- 67 16 98 % -- --   06/26/24 0132 129/52 -- -- 60 -- 96 % -- --   06/26/24 0131 -- 98.4  F (36.9  C) Temporal -- 18 -- 1.88 m (6' 2\") 86.2 kg (190 lb)     Physical Exam  Constitutional: Alert, attentive, GCS 15   Eyes: EOM are normal, anicteric, conjugate gaze  CV: distal extremities warm, well perfused  Chest: Non-labored breathing on RA  Neurological: Alert, attentive, moving all extremities equally.   Skin: Skin is warm and dry.    Diagnostics     Imaging   Head CT w/o contrast   Final Result   IMPRESSION:   1.  Normal head CT.          Independent Interpretation   I pursued his head CT, see no evidence of overt intracranial hemorrhage.  ED Course    Medications Administered   Medications - No data to display    Procedures   Procedures     Discussion of Management   None    Social Determinants of Health adding to complexity of care   None    Medical Decision Making / Diagnosis   MDM   Yneny Cazares is a 23 year old " male without significant past medical history presenting for sudden onset severe headache 2 hours prior to arrival while emptying defecate.  This is in the setting of closed head injury with concussion 2 weeks ago after a as needed head during soccer game he had brief LOC at that time but has been cleared to return to play.  He was sent for noncontrasted CT imaging which shows no evidence of intracranial hemorrhage, given sensitivity to third-generation scanners within 6 hours, no indication for further workup, I do not suspect meningitis.  Low suspicion for cerebral vasospasm given absence of neurologic deficits associated with his symptoms.  I recommended Tylenol, ibuprofen, and PCP follow-up.    Disposition   The patient was discharged.     ICD-10 Codes:     ICD-10-CM    1. Acute nonintractable headache, unspecified headache type  R51.9 Concussion  Referral           Geraldo Coreas MD  [employer]  4:04 AM 06/26/24         Geraldo Coreas MD  [employer]  2:14 AM 06/26/24          Geraldo Coreas MD  06/26/24 0402

## 2024-06-26 NOTE — ED TRIAGE NOTES
.states had concussion June 8th , playing soccer. Tonight at 2330 was straining to have BM had sudden onset worst headache ever. No Hx of headaches in past. Denies any Nausea denies any light sensitivity , did not take anything for it. Ambulatory moves all extremities equal. PERRELA pupils 3 mm bilaterally.      Triage Assessment (Adult)       Row Name 06/26/24 0127          Triage Assessment    Airway WDL WDL        Respiratory WDL    Respiratory WDL WDL        Skin Circulation/Temperature WDL    Skin Circulation/Temperature WDL WDL        Cardiac WDL    Cardiac WDL WDL        Peripheral/Neurovascular WDL    Peripheral Neurovascular WDL WDL        Cognitive/Neuro/Behavioral WDL    Cognitive/Neuro/Behavioral WDL WDL

## 2024-10-06 ENCOUNTER — HEALTH MAINTENANCE LETTER (OUTPATIENT)
Age: 24
End: 2024-10-06